# Patient Record
Sex: MALE | Race: OTHER | NOT HISPANIC OR LATINO
[De-identification: names, ages, dates, MRNs, and addresses within clinical notes are randomized per-mention and may not be internally consistent; named-entity substitution may affect disease eponyms.]

---

## 2023-03-28 ENCOUNTER — APPOINTMENT (OUTPATIENT)
Dept: ORTHOPEDIC SURGERY | Facility: CLINIC | Age: 33
End: 2023-03-28
Payer: COMMERCIAL

## 2023-03-28 ENCOUNTER — NON-APPOINTMENT (OUTPATIENT)
Age: 33
End: 2023-03-28

## 2023-03-28 DIAGNOSIS — S16.1XXA STRAIN OF MUSCLE, FASCIA AND TENDON AT NECK LEVEL, INITIAL ENCOUNTER: ICD-10-CM

## 2023-03-28 DIAGNOSIS — M25.511 PAIN IN RIGHT SHOULDER: ICD-10-CM

## 2023-03-28 PROBLEM — Z00.00 ENCOUNTER FOR PREVENTIVE HEALTH EXAMINATION: Status: ACTIVE | Noted: 2023-03-28

## 2023-03-28 PROCEDURE — 73030 X-RAY EXAM OF SHOULDER: CPT | Mod: RT

## 2023-03-28 PROCEDURE — 99213 OFFICE O/P EST LOW 20 MIN: CPT

## 2023-03-28 PROCEDURE — 72040 X-RAY EXAM NECK SPINE 2-3 VW: CPT

## 2023-03-28 RX ORDER — NABUMETONE 500 MG/1
500 TABLET, FILM COATED ORAL
Qty: 60 | Refills: 0 | Status: ACTIVE | COMMUNITY
Start: 2023-03-28 | End: 1900-01-01

## 2023-03-28 NOTE — ASSESSMENT
[FreeTextEntry1] : Recommending physical therapy, will send nabumetone to the pharmacy.  Heating pad.  I do feel most of his symptoms are stemming from his cervical spine and trapezius.  He will follow-up with Dr. Lyons for further evaluation.  Patient to take a few days off from work, plans on returning back to work next week.\par \par This patient was seen under the supervision of Dr. Galloway.\par

## 2023-03-28 NOTE — IMAGING
[de-identified] : On examination of the right shoulder no swelling, no ecchymosis no erythema.  He has no palpable tenderness over the anterior posterior or shoulder.  No tenderness over the clavicle.  Points to the right trapezius where he has tenderness.  No tenderness over the cervical vertebra.  He has good range of motion to the right shoulder, negative drop arm, good rotator cuff strength.  Negative Chapman.  Full range of motion of the elbow.\par \par X-ray right shoulder today no obvious fracture or dislocation\par \par X-ray cervical spine today no obvious fracture or dislocation degenerative change noted

## 2023-03-28 NOTE — HISTORY OF PRESENT ILLNESS
[de-identified] : 32-year-old male comes in today for an evaluation of his right shoulder pain.  States that this morning he was in the shower he went to dry his head off and felt a pull from his neck into his shoulder.  Denies numbness and tingling just pain.  Has no prior history of neck pain or shoulder pain.  He works as a .  He is right-hand dominant.  Took some Tylenol this morning.

## 2023-03-31 ENCOUNTER — APPOINTMENT (OUTPATIENT)
Dept: ORTHOPEDIC SURGERY | Facility: CLINIC | Age: 33
End: 2023-03-31
Payer: COMMERCIAL

## 2023-03-31 ENCOUNTER — NON-APPOINTMENT (OUTPATIENT)
Age: 33
End: 2023-03-31

## 2023-03-31 PROCEDURE — 99213 OFFICE O/P EST LOW 20 MIN: CPT

## 2023-03-31 NOTE — HISTORY OF PRESENT ILLNESS
[de-identified] : 30-year-old male presents with a week of neck pain.  Denies a specific injury.  He works as a heavy .  He also asked about bike.  Denies any pain radiating down his arms however he does have pain that radiates to his shoulders and into her shoulder blades.  Denies numbness or tingling.  Denies balance issues.  Denies loss of bladder or bowel.  The patient was prescribed an anti-inflammatory but its not helping him.  He has not yet started physical therapy but has an appointment scheduled.

## 2023-03-31 NOTE — DISCUSSION/SUMMARY
[de-identified] : 32-year-old male with cervical disc degeneration and neck pain.  I recommend physical therapy and I am prescribing him a different anti-inflammatory meloxicam.  He will follow-up with Dr. Mason in 4 weeks.

## 2023-03-31 NOTE — PHYSICAL EXAM
[de-identified] : TTP midline cervical spine and paraspinal musculature \par \par Strength                                                                    \par Deltoid\par   Right: 5/5; Left: 5/5                     \par Biceps\par   Right: 5/5; Left: 5/5                  \par Triceps     \par   Right: 5/5; Left: 5/5                                \par Wrist Extensors  \par   Right: 5/5; Left: 5/5\par Finger Flexors  \par   Right: 5/5; Left: 5/5\par IO \par   Right: 5/5; Left: 5/5\par \par Sensation\par C5\par   Right: 2/2; Left: 2/2\par C6\par   Right: 2/2; Left: 2/2\par C7\par   Right: 2/2; Left: 2/2\par C8\par   Right: 2/2; Left: 2/2\par T1\par   Right: 2/2; Left: 2/2\par \par Reflexes\par Biceps\par   Right: 2+; Left 2+\par Triceps\par   Right: 2+; Left 2+\par Omer's\par  Right: Negative; L: Negative\par

## 2023-03-31 NOTE — DATA REVIEWED
[FreeTextEntry1] : I reviewed the patient's AP and lateral cervical x-rays on March 20, 2023.  Those demonstrate some disc degeneration.

## 2023-04-10 ENCOUNTER — APPOINTMENT (OUTPATIENT)
Dept: PAIN MANAGEMENT | Facility: CLINIC | Age: 33
End: 2023-04-10
Payer: COMMERCIAL

## 2023-04-10 DIAGNOSIS — M47.812 SPONDYLOSIS W/OUT MYELOPATHY OR RADICULOPATHY, CERVICAL REGION: ICD-10-CM

## 2023-04-10 PROCEDURE — 96136 PSYCL/NRPSYC TST PHY/QHP 1ST: CPT | Mod: 59

## 2023-04-10 PROCEDURE — 99204 OFFICE O/P NEW MOD 45 MIN: CPT | Mod: 25

## 2023-04-10 NOTE — ASSESSMENT
[FreeTextEntry1] : 32 year old male presenting with improving facet mediated pain. I will have him continue with conservative care. He will follow up in 6 weeks for reassessment. Upon reassessment if his pain is flared up, we will re discuss the option of medial branch blocks. \par \par \par Neuropsychological SOAPP and PCS testing was performed as an evaluation of cognition, mood, personality, behavior to assess likelihood of addiction, misuse, other aberrant medication-related behaviors, and different thoughts and feelings that may be associated with pain. The total time spent rendering and interpreting the service was approximately 20 minutes. Results will be implemented in the appropriate care of the patient \par \par Entered by Cher Braxton, acting as scribe for Dr. Mason.\par  \par The documentation recorded by the scribe, in my presence, accurately reflects the service I personally performed, and the decisions made by me with my edits as appropriate.\par  \par Best Regards, \par Jefrfey Mason MD \par Board Certified, Anesthesiology \par Board Certified, Pain Medicine\par

## 2023-04-10 NOTE — PHYSICAL EXAM
[de-identified] : NECK - tenderness into the cervical paraspinals. ROM restricted. Pain with extension. Positive facet loading on the right.

## 2023-04-10 NOTE — DATA REVIEWED
[FreeTextEntry1] : AP and lateral cervical x-rays on March 20, 2023. Those demonstrate some disc degeneration. \par \par \par SOAPP: Scored a 0 , low risk.\par  \par [place] REGISTRY: Reviewed .  \par  \par UDS: No data obtained today. \par   \par Medications that trigger a UDS: Benzodiazepines (Ativan, Xanax, Valium) etc, Barbiturates, Narcotics (Avinza, Butrans, hydrocodone, Codeine, Aliza, Methadone, Morphine, MS Contin, Opana, oxycodone, Oxycontin, Suboxone etc), Pregabalin (Lyrica), Tramadol (Ultacet, Utram etc), Tapentadol, (Nucynta) and Elist Drugs (cocaine, THC, Etc.)\par  \par Risk factors: Bipolar Illness, positive for any an illicit drugs, history of any ETOH and drug abuse, any signs of diversion, Sharing Meds, selling meds. Non consistent [place] drug reporting and above 120meq of morphine\par  \par Low risk: Patient has combination of a low risk SOAP and no risk factors. UDS would be repeated randomly every quarter

## 2023-04-10 NOTE — HISTORY OF PRESENT ILLNESS
[FreeTextEntry1] : HISTORY OF PRESENT ILLNESS: Mr. Bolton is a 32 year old male complaining of neck pain. \par \par He states the pain is mainly on the right side and radiates into the shoulder and into the bicep. He states there is stiffness along with spasms. He has pain with rotating the neck to the right. He rates the pain at a 6/10 on the pain scale. He states the pain is intermittent. \par \par The pain started after getting ready for work 1 day.  The patient has had this pain for 14 days.  Patient describes the pain as moderate to severe.  During the last month the pain has been intermittent with symptoms worsening night. Pain described as pressure-like, cramping, throbbing.  Pain is increased with lying down.  Pain is decreased with standing, sitting.  Pain is not changed with walking, exercising, relaxing, coughing/sneezing and bowel movements.  Bowel or bladder habits have not changed.\par  \par ACTIVITIES: Patient could walk many blocks before the pain starts.  Patient can sit many hours before pain starts.  Patient can stand many our before pain starts.  The patient sometimes lies down because of pain.  Patient uses no assistive walking device at this time. \par \par PRIOR PAIN TREATMENTS:  Moderate relief with physical therapy, exercise and heat treatment.\par \par Prior Pain Medications:  Tylenol, meloxicam.\par

## 2023-04-19 ENCOUNTER — APPOINTMENT (OUTPATIENT)
Dept: ORTHOPEDIC SURGERY | Facility: CLINIC | Age: 33
End: 2023-04-19

## 2023-05-24 ENCOUNTER — APPOINTMENT (OUTPATIENT)
Dept: PAIN MANAGEMENT | Facility: CLINIC | Age: 33
End: 2023-05-24

## 2023-05-25 ENCOUNTER — RX RENEWAL (OUTPATIENT)
Age: 33
End: 2023-05-25

## 2023-05-25 RX ORDER — MELOXICAM 15 MG/1
15 TABLET ORAL
Qty: 30 | Refills: 1 | Status: ACTIVE | COMMUNITY
Start: 2023-03-31 | End: 1900-01-01